# Patient Record
Sex: FEMALE | Race: WHITE | NOT HISPANIC OR LATINO | Employment: OTHER | URBAN - METROPOLITAN AREA
[De-identification: names, ages, dates, MRNs, and addresses within clinical notes are randomized per-mention and may not be internally consistent; named-entity substitution may affect disease eponyms.]

---

## 2018-06-28 ENCOUNTER — OFFICE VISIT (OUTPATIENT)
Dept: URGENT CARE | Facility: CLINIC | Age: 83
End: 2018-06-28
Payer: MEDICARE

## 2018-06-28 ENCOUNTER — APPOINTMENT (OUTPATIENT)
Dept: RADIOLOGY | Facility: CLINIC | Age: 83
End: 2018-06-28
Payer: MEDICARE

## 2018-06-28 VITALS
HEART RATE: 70 BPM | WEIGHT: 126.6 LBS | TEMPERATURE: 97.6 F | DIASTOLIC BLOOD PRESSURE: 70 MMHG | RESPIRATION RATE: 16 BRPM | BODY MASS INDEX: 23.9 KG/M2 | OXYGEN SATURATION: 99 % | HEIGHT: 61 IN | SYSTOLIC BLOOD PRESSURE: 130 MMHG

## 2018-06-28 DIAGNOSIS — S69.92XA INJURY, FINGER, LEFT, INITIAL ENCOUNTER: ICD-10-CM

## 2018-06-28 DIAGNOSIS — S60.032A CONTUSION OF LEFT MIDDLE FINGER WITHOUT DAMAGE TO NAIL, INITIAL ENCOUNTER: Primary | ICD-10-CM

## 2018-06-28 PROCEDURE — 73140 X-RAY EXAM OF FINGER(S): CPT

## 2018-06-28 PROCEDURE — 99203 OFFICE O/P NEW LOW 30 MIN: CPT | Performed by: FAMILY MEDICINE

## 2018-06-28 RX ORDER — ATORVASTATIN CALCIUM 40 MG/1
40 TABLET, FILM COATED ORAL DAILY
COMMUNITY

## 2018-06-28 RX ORDER — METOPROLOL SUCCINATE 50 MG/1
25 TABLET, EXTENDED RELEASE ORAL DAILY
COMMUNITY

## 2018-06-28 RX ORDER — BUMETANIDE 1 MG/1
1 TABLET ORAL DAILY
COMMUNITY

## 2018-06-28 RX ORDER — CLOPIDOGREL BISULFATE 75 MG/1
75 TABLET ORAL DAILY
COMMUNITY

## 2018-06-28 NOTE — PATIENT INSTRUCTIONS
Left 3rd digit contusion   - xray shows no acute osseous abnormalities   - ecchymosis likely resulting from senile purpura, patient is on a blood thinner   - I have recommended to rest the hand and apply ice to the site   - should be seen by PCP for follow up in 2-3 days   - if symptoms persist despite treatment, worsen, or any new symptoms present, should be seen in the ER

## 2018-07-03 NOTE — PROGRESS NOTES
[facility]        NAME: Scott Gardiner is a 80 y o  female  : 1929    MRN: [id number]  DATE: 2018  TIME: 10:14 PM    Assessment and Plan   Contusion of left middle finger without damage to nail, initial encounter [[id number]]  No diagnosis found  Patient Instructions     Patient Instructions   Left 3rd digit contusion   - xray shows no acute osseous abnormalities   - ecchymosis likely resulting from senile purpura, patient is on a blood thinner   - I have recommended to rest the hand and apply ice to the site   - should be seen by PCP for follow up in 2-3 days   - if symptoms persist despite treatment, worsen, or any new symptoms present, should be seen in the ER     Chief Complaint     Chief Complaint   Patient presents with    Finger Injury     left middle finger discolored and swollen since yesterday no recent injury pt is on ASA 81 mg Plavix 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         History of Present Illness       81 yo F presents c/o bruising noted on L hand 3rd digit x 1 day  She denies any trauma or injury of the finger  No associated pain or swelling  No redness  No numbness/tingling or weakness  No cuts or open wounds  She is able to move the finger in full ROM w/o any pain or difficulty  No wrist/arm pain  She does not wear any rings on that finger  She is on Plavix daily  Review of Systems   Review of Systems   Constitutional: Negative  Respiratory: Negative  Cardiovascular: Negative  Musculoskeletal: Negative  Skin:        As noted in HPI   Neurological: Negative      Hematological:        As noted in HPI         Current Medications       Current Outpatient Prescriptions:     atorvastatin (LIPITOR) 40 mg tablet, Take 40 mg by mouth daily, Disp: , Rfl:     bumetanide (BUMEX) 1 mg tablet, Take 1 mg by mouth daily, Disp: , Rfl:     clopidogrel (PLAVIX) 75 mg tablet, Take 75 mg by mouth daily, Disp: , Rfl:     metoprolol succinate (TOPROL-XL) 50 mg 24 hr tablet, Take 25 mg by mouth daily, Disp: , Rfl:     Current Allergies     Allergies as of 06/28/2018 - Reviewed 06/28/2018   Allergen Reaction Noted    Epinephrine  06/28/2018    Iodine  06/28/2018            The following portions of the patient's history were reviewed and updated as appropriate: allergies, current medications, past family history, past medical history, past social history, past surgical history and problem list      Past Medical History:   Diagnosis Date    Abnormal angiogram     Hyperlipidemia with target low density lipoprotein (LDL) cholesterol less than 100 mg/dL        Past Surgical History:   Procedure Laterality Date    BYPASS FEMORAL-PERONEAL      CORONARY ARTERY BYPASS GRAFT         Family History   Problem Relation Age of Onset    No Known Problems Mother     No Known Problems Father          Medications have been verified  Objective   /70   Pulse 70   Temp 97 6 °F (36 4 °C)   Resp 16   Ht 5' 0 5" (1 537 m)   Wt 57 4 kg (126 lb 9 6 oz)   SpO2 99%   BMI 24 32 kg/m²        Physical Exam     Physical Exam   Constitutional: She is oriented to person, place, and time  Vital signs are normal  She appears well-developed and well-nourished  She is active and cooperative  Non-toxic appearance  She does not have a sickly appearance  She does not appear ill  No distress  Cardiovascular: Normal rate, regular rhythm and normal heart sounds  Pulmonary/Chest: Effort normal and breath sounds normal    Musculoskeletal: Normal range of motion  She exhibits no edema, tenderness or deformity  L 3rd digit: + diffuse ecchymosis on the palm side noted  No erythema or swelling  Non-tender to touch  No joint tenderness or swelling  Skin is appropriately warm and intact  Sensations intact  Good capillary refill  Finger w/ full ROM, no pain or stiffness w/ movement  Neurological: She is alert and oriented to person, place, and time  Skin: Skin is warm, dry and intact   Bruising and ecchymosis noted  No rash noted  She is not diaphoretic  No erythema  + 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